# Patient Record
Sex: MALE | Race: BLACK OR AFRICAN AMERICAN | NOT HISPANIC OR LATINO | URBAN - METROPOLITAN AREA
[De-identification: names, ages, dates, MRNs, and addresses within clinical notes are randomized per-mention and may not be internally consistent; named-entity substitution may affect disease eponyms.]

---

## 2024-04-24 ENCOUNTER — EMERGENCY (EMERGENCY)
Facility: HOSPITAL | Age: 54
LOS: 1 days | Discharge: HOME | End: 2024-04-24
Attending: EMERGENCY MEDICINE | Admitting: EMERGENCY MEDICINE
Payer: MEDICAID

## 2024-04-24 VITALS
TEMPERATURE: 98 F | HEART RATE: 90 BPM | DIASTOLIC BLOOD PRESSURE: 83 MMHG | RESPIRATION RATE: 18 BRPM | SYSTOLIC BLOOD PRESSURE: 120 MMHG | OXYGEN SATURATION: 100 %

## 2024-04-24 VITALS
SYSTOLIC BLOOD PRESSURE: 119 MMHG | RESPIRATION RATE: 20 BRPM | TEMPERATURE: 98 F | DIASTOLIC BLOOD PRESSURE: 87 MMHG | OXYGEN SATURATION: 98 % | HEART RATE: 75 BPM

## 2024-04-24 LAB
ALBUMIN SERPL ELPH-MCNC: 4.3 G/DL — SIGNIFICANT CHANGE UP (ref 3.3–5)
ALP SERPL-CCNC: 80 U/L — SIGNIFICANT CHANGE UP (ref 40–120)
ALT FLD-CCNC: 24 U/L — SIGNIFICANT CHANGE UP (ref 4–41)
ANION GAP SERPL CALC-SCNC: 16 MMOL/L — HIGH (ref 7–14)
AST SERPL-CCNC: 28 U/L — SIGNIFICANT CHANGE UP (ref 4–40)
BASOPHILS # BLD AUTO: 0.04 K/UL — SIGNIFICANT CHANGE UP (ref 0–0.2)
BASOPHILS NFR BLD AUTO: 0.7 % — SIGNIFICANT CHANGE UP (ref 0–2)
BILIRUB SERPL-MCNC: 0.4 MG/DL — SIGNIFICANT CHANGE UP (ref 0.2–1.2)
BUN SERPL-MCNC: 12 MG/DL — SIGNIFICANT CHANGE UP (ref 7–23)
CALCIUM SERPL-MCNC: 9.3 MG/DL — SIGNIFICANT CHANGE UP (ref 8.4–10.5)
CHLORIDE SERPL-SCNC: 104 MMOL/L — SIGNIFICANT CHANGE UP (ref 98–107)
CO2 SERPL-SCNC: 19 MMOL/L — LOW (ref 22–31)
CREAT SERPL-MCNC: 0.93 MG/DL — SIGNIFICANT CHANGE UP (ref 0.5–1.3)
EGFR: 98 ML/MIN/1.73M2 — SIGNIFICANT CHANGE UP
EOSINOPHIL # BLD AUTO: 0.1 K/UL — SIGNIFICANT CHANGE UP (ref 0–0.5)
EOSINOPHIL NFR BLD AUTO: 1.7 % — SIGNIFICANT CHANGE UP (ref 0–6)
GLUCOSE SERPL-MCNC: 91 MG/DL — SIGNIFICANT CHANGE UP (ref 70–99)
HCT VFR BLD CALC: 45.6 % — SIGNIFICANT CHANGE UP (ref 39–50)
HGB BLD-MCNC: 16.2 G/DL — SIGNIFICANT CHANGE UP (ref 13–17)
IANC: 2.69 K/UL — SIGNIFICANT CHANGE UP (ref 1.8–7.4)
IMM GRANULOCYTES NFR BLD AUTO: 0.3 % — SIGNIFICANT CHANGE UP (ref 0–0.9)
LYMPHOCYTES # BLD AUTO: 2.63 K/UL — SIGNIFICANT CHANGE UP (ref 1–3.3)
LYMPHOCYTES # BLD AUTO: 44.8 % — HIGH (ref 13–44)
MCHC RBC-ENTMCNC: 29.7 PG — SIGNIFICANT CHANGE UP (ref 27–34)
MCHC RBC-ENTMCNC: 35.5 GM/DL — SIGNIFICANT CHANGE UP (ref 32–36)
MCV RBC AUTO: 83.5 FL — SIGNIFICANT CHANGE UP (ref 80–100)
MONOCYTES # BLD AUTO: 0.39 K/UL — SIGNIFICANT CHANGE UP (ref 0–0.9)
MONOCYTES NFR BLD AUTO: 6.6 % — SIGNIFICANT CHANGE UP (ref 2–14)
NEUTROPHILS # BLD AUTO: 2.69 K/UL — SIGNIFICANT CHANGE UP (ref 1.8–7.4)
NEUTROPHILS NFR BLD AUTO: 45.9 % — SIGNIFICANT CHANGE UP (ref 43–77)
NRBC # BLD: 0 /100 WBCS — SIGNIFICANT CHANGE UP (ref 0–0)
NRBC # FLD: 0 K/UL — SIGNIFICANT CHANGE UP (ref 0–0)
PLATELET # BLD AUTO: 252 K/UL — SIGNIFICANT CHANGE UP (ref 150–400)
POTASSIUM SERPL-MCNC: 4.3 MMOL/L — SIGNIFICANT CHANGE UP (ref 3.5–5.3)
POTASSIUM SERPL-SCNC: 4.3 MMOL/L — SIGNIFICANT CHANGE UP (ref 3.5–5.3)
PROT SERPL-MCNC: 7.2 G/DL — SIGNIFICANT CHANGE UP (ref 6–8.3)
RBC # BLD: 5.46 M/UL — SIGNIFICANT CHANGE UP (ref 4.2–5.8)
RBC # FLD: 12.1 % — SIGNIFICANT CHANGE UP (ref 10.3–14.5)
SODIUM SERPL-SCNC: 139 MMOL/L — SIGNIFICANT CHANGE UP (ref 135–145)
TROPONIN T, HIGH SENSITIVITY RESULT: 7 NG/L — SIGNIFICANT CHANGE UP
TROPONIN T, HIGH SENSITIVITY RESULT: 9 NG/L — SIGNIFICANT CHANGE UP
WBC # BLD: 5.87 K/UL — SIGNIFICANT CHANGE UP (ref 3.8–10.5)
WBC # FLD AUTO: 5.87 K/UL — SIGNIFICANT CHANGE UP (ref 3.8–10.5)

## 2024-04-24 PROCEDURE — 74174 CTA ABD&PLVS W/CONTRAST: CPT | Mod: 26,MC

## 2024-04-24 PROCEDURE — 93010 ELECTROCARDIOGRAM REPORT: CPT

## 2024-04-24 PROCEDURE — 71275 CT ANGIOGRAPHY CHEST: CPT | Mod: 26,MC

## 2024-04-24 PROCEDURE — 99285 EMERGENCY DEPT VISIT HI MDM: CPT

## 2024-04-24 PROCEDURE — 71045 X-RAY EXAM CHEST 1 VIEW: CPT | Mod: 26

## 2024-04-24 RX ORDER — KETOROLAC TROMETHAMINE 30 MG/ML
15 SYRINGE (ML) INJECTION ONCE
Refills: 0 | Status: DISCONTINUED | OUTPATIENT
Start: 2024-04-24 | End: 2024-04-24

## 2024-04-24 RX ORDER — MORPHINE SULFATE 50 MG/1
4 CAPSULE, EXTENDED RELEASE ORAL ONCE
Refills: 0 | Status: DISCONTINUED | OUTPATIENT
Start: 2024-04-24 | End: 2024-04-24

## 2024-04-24 RX ORDER — DIAZEPAM 5 MG
5 TABLET ORAL ONCE
Refills: 0 | Status: DISCONTINUED | OUTPATIENT
Start: 2024-04-24 | End: 2024-04-24

## 2024-04-24 RX ORDER — CYCLOBENZAPRINE HYDROCHLORIDE 10 MG/1
1 TABLET, FILM COATED ORAL
Qty: 20 | Refills: 0
Start: 2024-04-24 | End: 2024-05-03

## 2024-04-24 RX ORDER — LIDOCAINE 4 G/100G
2 CREAM TOPICAL ONCE
Refills: 0 | Status: COMPLETED | OUTPATIENT
Start: 2024-04-24 | End: 2024-04-24

## 2024-04-24 RX ORDER — LIDOCAINE 4 G/100G
1 CREAM TOPICAL
Qty: 2 | Refills: 0
Start: 2024-04-24 | End: 2024-05-03

## 2024-04-24 RX ORDER — ACETAMINOPHEN 500 MG
975 TABLET ORAL ONCE
Refills: 0 | Status: COMPLETED | OUTPATIENT
Start: 2024-04-24 | End: 2024-04-24

## 2024-04-24 RX ORDER — LIDOCAINE 4 G/100G
1 CREAM TOPICAL
Qty: 2 | Refills: 0
Start: 2024-04-24 | End: 2024-04-30

## 2024-04-24 RX ORDER — METHOCARBAMOL 500 MG/1
2 TABLET, FILM COATED ORAL
Qty: 28 | Refills: 0
Start: 2024-04-24 | End: 2024-04-30

## 2024-04-24 RX ADMIN — Medication 5 MILLIGRAM(S): at 19:58

## 2024-04-24 RX ADMIN — MORPHINE SULFATE 4 MILLIGRAM(S): 50 CAPSULE, EXTENDED RELEASE ORAL at 13:33

## 2024-04-24 RX ADMIN — Medication 15 MILLIGRAM(S): at 19:58

## 2024-04-24 RX ADMIN — LIDOCAINE 2 PATCH: 4 CREAM TOPICAL at 13:33

## 2024-04-24 RX ADMIN — Medication 975 MILLIGRAM(S): at 16:26

## 2024-04-24 NOTE — ED ADULT NURSE REASSESSMENT NOTE - NS ED NURSE REASSESS COMMENT FT1
Report received from SALVATORE De La Cruz. Patient is AOx4 and in no signs of acute distress. patient states he feels better and wants to go home and is ready. Denies chest pain, pain, or SOB. VS as noted. Comfort measures maintained. Safety Maintained.

## 2024-04-24 NOTE — ED PROVIDER NOTE - PHYSICAL EXAMINATION
CONSTITUTIONAL: uncomfortable appearing, groaning in pain awake, alert  ENMT: Airway patent, tolerating secretions.    NECK: Supple. No JVD  EYES: Clear bilaterally, pupils equal, round  CARDIAC: Warm, well-perfused  RESPIRATORY: Equal Chest Rise, no stridor. No respiratory distress.   GASTROINTESTINAL: Non-distended  MUSCULOSKELETAL: No gross joint deformity. TTP along L scapular area. ROM of shoulder full   NEUROLOGICAL: Alert. Speech Fluent. Attends to conversation and exam  SKIN:  No erythema    PSYCHIATRIC: Normal affect. Cooperative with history and exam

## 2024-04-24 NOTE — ED ADULT NURSE NOTE - OBJECTIVE STATEMENT
Pt received to [facility] presents with left sided back pain radiating down left arm and chest, intermittent x 2weeks, worse today. Pt appears uncomfortable. a&ox4, ambulatory at baseline, skin intact, respirations even and unlabored, abd soft and non-distended, nontender to palpation. NSr on CM, normotensive, denies recent injuries, fever, chills, sob, or any other symptoms. Arrives with 18G placed by EMS in left wrist, flushes without difficulty, labs drawn and sent, ECG in progress, will continue to monitor .

## 2024-04-24 NOTE — ED PROVIDER NOTE - PATIENT PORTAL LINK FT
You can access the FollowMyHealth Patient Portal offered by [facility] by registering at the following website: [email or web address]. By joining [facility]’s FollowMyHealth portal, you will also be able to view your health information using other applications (apps) compatible with our system. You can access the FollowMyHealth Patient Portal offered by [facility] by registering at the following website: [email or web address]. By joining [facility]’s FollowMyHealth portal, you will also be able to view your health information using other applications (apps) compatible with our system.

## 2024-04-24 NOTE — ED PROVIDER NOTE - NSFOLLOWUPINSTRUCTIONS_ED_ALL_ED_FT
You were seen in the ER for chest pain.    Your EKG, chest x-ray, and blood work did not show any abnormalities requiring hospitalization.    Take Tylenol 975mg every 8 hours for the next 3 days for pain, along with flexeril and lidocaine opatch. Although we are sending you home, no ER evaluation is complete without outpatient follow-up. Follow up with your regular doctor/orthopedics surgery within the next 1 week. Return to the ER for worsening symptoms, trouble breathing, fever You were seen in the ER for chest pain.    Your EKG, chest x-ray, and blood work did not show any abnormalities requiring hospitalization.    Take Tylenol 975mg every 8 hours for the next 3 days for pain, along with flexeril and lidocaine patch. Although we are sending you home, no ER evaluation is complete without outpatient follow-up. Follow up with your regular cardiology/orthopedics surgery within the next 1 week. Return to the ER for worsening symptoms, trouble breathing, fever  The hospital will call you for orthopedics and cardiology follow up however info is below  (500) ORTHO-04 You were seen in the ER for chest pain.    Your EKG, chest x-ray, and blood work did not show any abnormalities requiring hospitalization.    - You can take Tylenol 1000mg every 6-8 hours as needed.  - You can take Motrin (ibuprofun) 600mg every 6-8 hours as needed.  - Can take ROBAXIN 1500mg upto twice a day (muscle relaxant) to help with pain. Do not drive or use machinery while taking this medication as you can be sleepy/woozy on it.  - Can also put LIDOCAINE patch in area once a day.   - You can trial lidocaine patch in area. Ice or heat for relief.     Follow up with your primary care provider.   Follow up with an ORTHOPEDIC provider if you continue to have pain     Rest, drink plenty of fluids.  Advance activity as tolerated.  Continue all previously prescribed medications as directed.  Follow up with your primary care physician in 48-72 hours- bring copies of your results.  Return to the ER for worsening or persistent symptoms, and/or ANY NEW OR CONCERNING SYMPTOMS.

## 2024-04-24 NOTE — ED PROVIDER NOTE - NS ED ROS FT
CONSTITUTIONAL: no fever or chills. No weakness    Eyes: No visual change  · ENMT: no runny nose or sore throat  · CARDIOVASCULAR: + chest pain   · RESPIRATORY:  no cough or  shortness of breath.  · GASTROINTESTINAL: No abdominal pain,no nausea,  no diarrhea, no vomiting  · GENITOURINARY: no dysuria or increased urinary frequency  · MUSCULOSKELETAL: no back pain    NEURO: No weakness, no numbness/tingling   · ROS STATEMENT: all other ROS negative except as per HPI

## 2024-04-24 NOTE — ED PROVIDER NOTE - PROGRESS NOTE DETAILS
pt with some discomfort but notes some improvement, ct and trop x 2 negative. will dc with outpatient follow up pt notes continued pain at of discharge - suspect MSK - will add add'l medications prior to discharge to attempt to control pain better - if unable will consider cdu [place] PGY3: Patient now feels improved, pain is highly reproducible over L trapezius. Okay to dc with ortho f/u and msk meds.

## 2024-04-24 NOTE — ED PROVIDER NOTE - OBJECTIVE STATEMENT
54M with no past medical hx presents to the ED with chest and back pain. Sx began approx 2 w ago and are getting worse. Pain located in the scapular area with radiation to the chest and down the arm. Pain intermittent, worse with movement. No N/V/D/numbness or tingling

## 2024-04-24 NOTE — ED PROVIDER NOTE - NSFOLLOWUPCLINICS_GEN_ALL_ED_FT
Cardiology at [facility]  Cardiology  [address] [place], [place] [address]  Phone: [phone]  Fax:      Cardiology at [facility]  Cardiology  [address] [place] [id number]  Phone: [phone]  Fax:     [facility] Sports Medicine  Sports Medicine  [facility] [place] [address]  Phone: [phone]  Fax:

## 2024-04-24 NOTE — ED ADULT TRIAGE NOTE - CHIEF COMPLAINT QUOTE
Pt. c/o pain and spasm to left sided back and left arm starting around an hour ago. Feels cramping in fingers. States he had similar episode a few weeks ago. No known injury.

## 2024-04-24 NOTE — ED PROVIDER NOTE - ATTENDING CONTRIBUTION TO CARE
brought to [facility] because of severe chest pain  and back pain that on assessment has been found to be present for weeks localized to back no sob also present in neck and radiates down arm   Patient recieived analgesics with relief CTA TAD negative as are lab  d/c with followup   RTED PRN

## 2024-04-24 NOTE — ED PROVIDER NOTE - ADDITIONAL NOTES AND INSTRUCTIONS:
To Whom it May Concern - Patient seen and evaluated in Emergency Room. Please excuse the above individual for medical care and recovery until date above. Thank you for you care. - Cathi Golden MD.

## 2024-04-24 NOTE — ED ADULT NURSE NOTE - NSFALLRISKINTERV_ED_ALL_ED
Assistance OOB with selected safe patient handling equipment if applicable/Communicate fall risk and risk factors to all staff, patient, and family/Provide visual cue: yellow wristband, yellow gown, etc/Reinforce activity limits and safety measures with patient and family/Call bell, personal items and telephone in reach/Instruct patient to call for assistance before getting out of bed/chair/stretcher/Non-slip footwear applied when patient is off stretcher/[place] to call system/Physically safe environment - no spills, clutter or unnecessary equipment/Purposeful Proactive Rounding/Room/bathroom lighting operational, light cord in reach

## 2024-04-24 NOTE — ED PROVIDER NOTE - CLINICAL SUMMARY MEDICAL DECISION MAKING FREE TEXT BOX
54M presents to the ED with chest/upper back pain - will eval for ACS/Aortic Dissection - if negative may be MSK strain/Sprain